# Patient Record
Sex: FEMALE | Race: BLACK OR AFRICAN AMERICAN | NOT HISPANIC OR LATINO | Employment: UNEMPLOYED | URBAN - METROPOLITAN AREA
[De-identification: names, ages, dates, MRNs, and addresses within clinical notes are randomized per-mention and may not be internally consistent; named-entity substitution may affect disease eponyms.]

---

## 2023-06-06 PROBLEM — S16.1XXA CERVICAL STRAIN, ACUTE, INITIAL ENCOUNTER: Status: ACTIVE | Noted: 2023-06-06

## 2023-09-05 PROBLEM — S16.1XXA CERVICAL STRAIN: Status: ACTIVE | Noted: 2023-09-05

## 2024-02-17 PROBLEM — M54.12 CERVICAL RADICULOPATHY: Status: ACTIVE | Noted: 2024-02-17

## 2024-02-17 PROBLEM — R20.2 PARESTHESIA: Status: ACTIVE | Noted: 2024-02-17

## 2024-05-01 ENCOUNTER — OFFICE VISIT (OUTPATIENT)
Dept: ORTHOPEDICS | Facility: CLINIC | Age: 44
End: 2024-05-01
Payer: MEDICAID

## 2024-05-01 VITALS
WEIGHT: 141.31 LBS | HEIGHT: 61 IN | HEART RATE: 90 BPM | SYSTOLIC BLOOD PRESSURE: 121 MMHG | BODY MASS INDEX: 26.68 KG/M2 | DIASTOLIC BLOOD PRESSURE: 77 MMHG

## 2024-05-01 DIAGNOSIS — G56.03 BILATERAL CARPAL TUNNEL SYNDROME: Primary | ICD-10-CM

## 2024-05-01 DIAGNOSIS — M79.641 PAIN OF RIGHT HAND: Primary | ICD-10-CM

## 2024-05-01 PROCEDURE — 1160F RVW MEDS BY RX/DR IN RCRD: CPT | Mod: CPTII,,,

## 2024-05-01 PROCEDURE — 1159F MED LIST DOCD IN RCRD: CPT | Mod: CPTII,,,

## 2024-05-01 PROCEDURE — 3078F DIAST BP <80 MM HG: CPT | Mod: CPTII,,,

## 2024-05-01 PROCEDURE — 99213 OFFICE O/P EST LOW 20 MIN: CPT | Mod: PBBFAC,PN

## 2024-05-01 PROCEDURE — 4010F ACE/ARB THERAPY RXD/TAKEN: CPT | Mod: CPTII,,,

## 2024-05-01 PROCEDURE — 3074F SYST BP LT 130 MM HG: CPT | Mod: CPTII,,,

## 2024-05-01 PROCEDURE — 99999 PR PBB SHADOW E&M-EST. PATIENT-LVL III: CPT | Mod: PBBFAC,,,

## 2024-05-01 PROCEDURE — 3008F BODY MASS INDEX DOCD: CPT | Mod: CPTII,,,

## 2024-05-01 PROCEDURE — 99203 OFFICE O/P NEW LOW 30 MIN: CPT | Mod: S$PBB,,,

## 2024-05-01 NOTE — PROGRESS NOTES
SUBJECTIVE:      Chief Complaint: bilateral hand numbness    History of Present Illness:  Patient is a 43 y.o. right hand dominant female who presents today with complaints of bilateral hand numbness, L > R. Patient reports numbness and stiffness in mostly 1-4th digits. Patient denies known BUD. States this has been ongoing for a few months. Numbness is worsen in morning.  Denies physical therapy, surgery, or injections to hands. Feels her  is affected. Denies DM, smoking, drug use, or DVT/PE.     The patient is a/an .    Onset of symptoms/DOI was 4-5 months prior.    Symptoms are aggravated by waking up and riding bike (gripping).    Symptoms are alleviated by rest.    Symptoms consist of pain and numbness/tingling.    The patient rates their pain as a 2/10.    Attempted treatment(s) and/or interventions include activity modifications, rest.     The patient denies any fevers, chills, N/V, D/C and presents for evaluation.       Past Medical History:   Diagnosis Date    Anxiety     Depression     Hypertension     Panic attack      Past Surgical History:   Procedure Laterality Date    HYSTERECTOMY       Review of patient's allergies indicates:  No Known Allergies  Social History     Social History Narrative    Not on file     No family history on file.      Current Outpatient Medications:     amLODIPine (NORVASC) 10 MG tablet, Take 1 tablet by mouth once daily, Disp: 30 tablet, Rfl: 0    lactulose (CHRONULAC) 20 gram/30 mL Soln, Take 45 mLs (30 g total) by mouth 3 (three) times daily., Disp: 240 mL, Rfl: 0      Review of Systems:  Constitutional: no fever or chills  Eyes: no visual changes  ENT: no nasal congestion or sore throat  Respiratory: no cough or shortness of breath  Cardiovascular: no chest pain  Gastrointestinal: no nausea or vomiting, tolerating diet  Musculoskeletal: numbness/tingling    OBJECTIVE:      Vital Signs (Most Recent):  Vitals:    05/01/24 1341   BP: 121/77   Pulse: 90   Weight:  "64.1 kg (141 lb 5 oz)   Height: 5' 1" (1.549 m)     Body mass index is 26.7 kg/m².      Physical Exam:  Constitutional: The patient appears well-developed and well-nourished. No distress.   Skin: No lesions appreciated  Head: Normocephalic and atraumatic.   Nose: Nose normal.   Ears: No deformities seen  Eyes: Conjunctivae and EOM are normal.   Neck: No tracheal deviation present.   Cardiovascular: Normal rate and intact distal pulses.    Pulmonary/Chest: Effort normal. No respiratory distress.   Abdominal: There is no guarding.   Neurological: The patient is alert.   Psychiatric: The patient has a normal mood and affect.     Bilateral Hand/Wrist Examination:    Observation/Inspection:  Swelling  none    Deformity  none  Discoloration  none     Scars   none    Atrophy  none    HAND/WRIST EXAMINATION:  Finkelstein's Test   Neg  WHAT Test    Neg  Snuff box tenderness   Neg  Huizar's Test    Neg  Hook of Hamate Tenderness  Neg  CMC grind    Neg  Circumduction test   Neg  TTP     none    Neurovascular Exam:  Digits WWP, brisk CR < 3s throughout  NVI motor/LTS to M/R/U nerves, radial pulse 2+  Tinel's Test - Carpal Tunnel  +  Tinel's Test - Cubital Tunnel  Neg  Phalen's Test    +  Median Nerve Compression Test +  AIN Intact (O-Louann), PIN Intact (Thumbs Up), Ulnar Intact (scissors)    ROM hand full, painless    ROM wrist full, painless    ROM elbow full, painless    Abdomen not guarded  Respirations nonlabored  Perfusion intact    Diagnostic Results:     Imaging - I independently viewed the patient's imaging as well as the radiology report.  Xrays of the patient's bilateral hands  demonstrate no evidence of any obvious acute fractures or dislocations or significant degenerative changes.      ASSESSMENT/PLAN:      1. Bilateral carpal tunnel syndrome          I made the decision to obtain old records of the patient including previous notes and imaging. If new imaging was ordered today of the extremity or extremities " evaluated. I independently reviewed and interpreted the xrays as well as prior imaging. Reviewed imaging in detail with patient.     We discussed at length different treatment options including conservative vs surgical management. These include anti-inflammatories, acetaminophen, rest, ice, heat, formal physical therapy including strengthening and stretching exercises, home exercise programs, injections, and finally surgical intervention.      Patient here for acute on chronic bilateral hand numbness.  Based on exam I do believe this is likely carpal tunnel syndrome.  We discussed this diagnosis in detail including treatment options.  Patient would like to hold on EMG at this time.  She would like to trial home exercise program and [facility] wrist extension braces.  Instructed patient to wear bracing at night.  We will see her back in about 8 weeks for consideration of EMG versus injections.    Follow up:  8 weeks  Xrays needed:  None     All of the patient's questions were answered and the patient will contact us if they have any questions or concerns in the interim.

## 2024-08-07 ENCOUNTER — OFFICE VISIT (OUTPATIENT)
Dept: ORTHOPEDICS | Facility: CLINIC | Age: 44
End: 2024-08-07
Payer: MEDICAID

## 2024-08-07 VITALS
HEIGHT: 61 IN | DIASTOLIC BLOOD PRESSURE: 90 MMHG | HEART RATE: 72 BPM | SYSTOLIC BLOOD PRESSURE: 130 MMHG | BODY MASS INDEX: 26.65 KG/M2 | WEIGHT: 141.13 LBS

## 2024-08-07 DIAGNOSIS — G56.03 BILATERAL CARPAL TUNNEL SYNDROME: Primary | ICD-10-CM

## 2024-08-07 PROCEDURE — 1160F RVW MEDS BY RX/DR IN RCRD: CPT | Mod: CPTII,,,

## 2024-08-07 PROCEDURE — 99999 PR PBB SHADOW E&M-EST. PATIENT-LVL III: CPT | Mod: PBBFAC,,,

## 2024-08-07 PROCEDURE — 3008F BODY MASS INDEX DOCD: CPT | Mod: CPTII,,,

## 2024-08-07 PROCEDURE — 4010F ACE/ARB THERAPY RXD/TAKEN: CPT | Mod: CPTII,,,

## 2024-08-07 PROCEDURE — 99213 OFFICE O/P EST LOW 20 MIN: CPT | Mod: PBBFAC,PN

## 2024-08-07 PROCEDURE — 3080F DIAST BP >= 90 MM HG: CPT | Mod: CPTII,,,

## 2024-08-07 PROCEDURE — 1159F MED LIST DOCD IN RCRD: CPT | Mod: CPTII,,,

## 2024-08-07 PROCEDURE — 3075F SYST BP GE 130 - 139MM HG: CPT | Mod: CPTII,,,

## 2024-08-07 PROCEDURE — 99213 OFFICE O/P EST LOW 20 MIN: CPT | Mod: S$PBB,,,

## 2024-08-07 RX ORDER — GABAPENTIN 300 MG/1
300 CAPSULE ORAL 3 TIMES DAILY
Qty: 90 CAPSULE | Refills: 1 | Status: SHIPPED | OUTPATIENT
Start: 2024-08-07 | End: 2025-08-07

## 2024-08-20 ENCOUNTER — TELEPHONE (OUTPATIENT)
Dept: ORTHOPEDICS | Facility: CLINIC | Age: 44
End: 2024-08-20
Payer: MEDICAID

## 2024-08-20 NOTE — TELEPHONE ENCOUNTER
Spoke with patient. Pt advise that next available appt is December. Pt also advise that she was place on a waiting list.

## 2024-08-20 NOTE — TELEPHONE ENCOUNTER
----- Message from Soraya West sent at 8/20/2024 12:17 PM CDT -----  Regarding: Appt Request  Patient called to schedule a follow up appt.No solutions found.    Please call back to further assist- [phone]

## 2024-09-24 ENCOUNTER — TELEPHONE (OUTPATIENT)
Dept: GASTROENTEROLOGY | Facility: CLINIC | Age: 44
End: 2024-09-24
Payer: MEDICAID

## 2024-09-24 NOTE — TELEPHONE ENCOUNTER
-LVm for the patient to call back to get an appt scheduled.      ---- Message from Luis Qureshi sent at 9/24/2024  1:19 PM CDT -----  Type:  Sooner Apoointment Request    Caller is requesting a sooner appointment  Name of Caller:pt  When is the first available appointment?none  Symptoms:acid reflux , other stomach issues   Would the patient rather a call back or a response via [facility]? Call   Best Call Back Number: [phone]  Additional Information:

## 2024-10-14 ENCOUNTER — TELEPHONE (OUTPATIENT)
Dept: OBSTETRICS AND GYNECOLOGY | Facility: CLINIC | Age: 44
End: 2024-10-14
Payer: MEDICAID

## 2024-10-14 NOTE — TELEPHONE ENCOUNTER
----- Message from Myriam sent at 10/14/2024  3:14 PM CDT -----  Regarding: verify appt tomorrow 1:15 - [facility]  Contact:  [phone]  PT called asking to check on appt she has scheduled for tomorrow. PT advised - [facility] is showing OON on verification. There is no referral on file either. Please reach out to patient to advise regarding her appt scheduled tomorrow 1:15    Patient can be contacted @# [phone]

## 2024-10-15 ENCOUNTER — OFFICE VISIT (OUTPATIENT)
Dept: OBSTETRICS AND GYNECOLOGY | Facility: CLINIC | Age: 44
End: 2024-10-15
Payer: MEDICAID

## 2024-10-15 VITALS
SYSTOLIC BLOOD PRESSURE: 150 MMHG | DIASTOLIC BLOOD PRESSURE: 95 MMHG | BODY MASS INDEX: 27.87 KG/M2 | WEIGHT: 147.63 LBS | HEART RATE: 68 BPM | HEIGHT: 61 IN

## 2024-10-15 DIAGNOSIS — Z01.419 WELL WOMAN EXAM WITH ROUTINE GYNECOLOGICAL EXAM: Primary | ICD-10-CM

## 2024-10-15 DIAGNOSIS — Z12.31 ENCOUNTER FOR SCREENING MAMMOGRAM FOR MALIGNANT NEOPLASM OF BREAST: ICD-10-CM

## 2024-10-15 DIAGNOSIS — R30.0 DYSURIA: ICD-10-CM

## 2024-10-15 DIAGNOSIS — Z12.4 SCREENING FOR CERVICAL CANCER: ICD-10-CM

## 2024-10-15 DIAGNOSIS — N89.8 VAGINAL DRYNESS: ICD-10-CM

## 2024-10-15 DIAGNOSIS — N95.1 MENOPAUSAL SYMPTOMS: ICD-10-CM

## 2024-10-15 PROCEDURE — 88175 CYTOPATH C/V AUTO FLUID REDO: CPT | Performed by: OBSTETRICS & GYNECOLOGY

## 2024-10-15 PROCEDURE — 99214 OFFICE O/P EST MOD 30 MIN: CPT | Mod: PBBFAC,PN | Performed by: OBSTETRICS & GYNECOLOGY

## 2024-10-15 PROCEDURE — 87086 URINE CULTURE/COLONY COUNT: CPT | Performed by: OBSTETRICS & GYNECOLOGY

## 2024-10-15 PROCEDURE — 99999 PR PBB SHADOW E&M-EST. PATIENT-LVL IV: CPT | Mod: PBBFAC,,, | Performed by: OBSTETRICS & GYNECOLOGY

## 2024-10-15 PROCEDURE — 87624 HPV HI-RISK TYP POOLED RSLT: CPT | Performed by: OBSTETRICS & GYNECOLOGY

## 2024-10-15 RX ORDER — TRAZODONE HYDROCHLORIDE 100 MG/1
100 TABLET ORAL NIGHTLY PRN
COMMUNITY
Start: 2024-07-25

## 2024-10-15 RX ORDER — AZELASTINE 1 MG/ML
SPRAY, METERED NASAL
COMMUNITY

## 2024-10-15 RX ORDER — DICYCLOMINE HYDROCHLORIDE 20 MG/1
TABLET ORAL
COMMUNITY
Start: 2024-09-16 | End: 2024-10-16

## 2024-10-15 RX ORDER — ESTRADIOL 0.1 MG/G
CREAM VAGINAL
Qty: 42.5 G | Refills: 1 | Status: SHIPPED | OUTPATIENT
Start: 2024-10-15

## 2024-10-15 RX ORDER — FLUOXETINE HYDROCHLORIDE 40 MG/1
40 CAPSULE ORAL
COMMUNITY
Start: 2024-04-25

## 2024-10-15 RX ORDER — FLUCONAZOLE 150 MG/1
150 TABLET ORAL
COMMUNITY
Start: 2024-08-12

## 2024-10-15 RX ORDER — METRONIDAZOLE 7.5 MG/G
GEL VAGINAL
COMMUNITY
Start: 2024-07-02

## 2024-10-15 RX ORDER — HYDROXYZINE PAMOATE 25 MG/1
CAPSULE ORAL
COMMUNITY

## 2024-10-15 RX ORDER — CYPROHEPTADINE HYDROCHLORIDE 4 MG/1
4 TABLET ORAL 2 TIMES DAILY
COMMUNITY
Start: 2024-06-17

## 2024-10-15 RX ORDER — CARIPRAZINE 1.5 MG/1
1.5 CAPSULE, GELATIN COATED ORAL NIGHTLY
COMMUNITY
Start: 2024-07-25

## 2024-10-15 RX ORDER — FERROUS SULFATE TAB 325 MG (65 MG ELEMENTAL FE) 325 (65 FE) MG
TAB ORAL
COMMUNITY
Start: 2024-08-12

## 2024-10-15 RX ORDER — LISINOPRIL 10 MG/1
TABLET ORAL
COMMUNITY
Start: 2024-04-09

## 2024-10-15 RX ORDER — FLUOXETINE HYDROCHLORIDE 20 MG/1
20 CAPSULE ORAL
COMMUNITY
Start: 2024-07-25

## 2024-10-15 RX ORDER — IBUPROFEN 800 MG/1
TABLET ORAL
COMMUNITY

## 2024-10-15 NOTE — PROGRESS NOTES
"Subjective     Patient ID: Vaishali Matthews is a 44 y.o. female.    Chief Complaint:  Well Woman      History of Present Illness  HPI  44 y.o.  presents for annual exam.  Overall doing well, but does report dysuria - frequently treated for UTI, symptoms improve, but then returns.  She reports a h/o hysterectomy, but on further questioning, states she has had both ovaries removed, but asked to keep uterus.  Ovaries removed on two separate occasions due to cysts - last one in 2017.  Reports hot flashes/night sweats since then.  Never on HRT.  Sexually active, but does report discomfort due to dryness.  No vaginal bleeding/spotting.  No other complaints today.  Of note, she has a h/o GERD, followed by GI, unsure when she is due for next colonoscopy, but states she has had one in the past.     GYN & OB History  No LMP recorded (lmp unknown). Patient has had a hysterectomy.   Date of Last Pap: 10/15/2024    OB History    Para Term  AB Living   2 2 2     2   SAB IAB Ectopic Multiple Live Births           2      # Outcome Date GA Lbr Brad/2nd Weight Sex Type Anes PTL Lv   2 Term 02    F Vag-Spont EPI  RICKY   1 Term 10/15/00    F Vag-Spont EPI  RICKY       Review of Systems  Review of Systems   Constitutional:  Negative for chills and fever.   Respiratory:  Negative for cough and shortness of breath.    Cardiovascular:  Negative for chest pain.   Gastrointestinal:  Positive for constipation and reflux. Negative for abdominal pain, blood in stool and diarrhea.   Genitourinary:  Positive for dysuria, hot flashes and vaginal dryness. Negative for dyspareunia, pelvic pain and vaginal discharge.   Musculoskeletal:  Negative for myalgias.   Neurological:  Negative for headaches.          Objective   Physical Exam    BP (!) 150/95 Comment: pt states she did not taker meds.  Pulse 68   Ht 5' 1" (1.549 m)   Wt 67 kg (147 lb 9.6 oz)   LMP  (LMP Unknown)   BMI 27.89 kg/m²     Gen: NAD  Resp: Normal " respiratory effort  Breast: Symmetric, nontender.  No masses.  No skin changes.  No nipple discharge.   Abd: soft, NT  Pelvic: Normal-appearing external female genitalia. Cervix present.  Uterus and adnexa difficult to palpate, no distinct masses or tenderness noted.  Ext: normal ROM  Psych: appropriate affect  Neuro: grossly intact         Assessment and Plan     Vaishali was seen today for well woman.    Diagnoses and all orders for this visit:    Well woman exam with routine gynecological exam  -     Hemoglobin A1C; Future  -     TSH; Future  -     Lipid Panel; Future    Encounter for screening mammogram for malignant neoplasm of breast  -     Mammo Digital Screening Bilat; Future    Dysuria  -     CULTURE, URINE    Vaginal dryness    Menopausal symptoms  -     US Pelvis Comp with Transvag NON-OB (xpd; Future  -     FOLLICLE STIMULATING HORMONE; Future  -     TESTOSTERONE; Future  -     ESTRADIOL; Future    Screening for cervical cancer  -     Liquid-Based Pap Smear, Screening  -     HPV High Risk Genotypes, PCR    Other orders  -     estradioL (ESTRACE) 0.01 % (0.1 mg/gram) vaginal cream; Place pea-sized amount vaginally with finger every night for 3 weeks, then 2-3 times per week.          Plan:  Routine annual exam with pap smear and breast exam today.  STD screening up to date.  MMG orders placed.  Continue f/u with GI regarding colonoscopy.    Discussed exam with patient - she does have a cervix/likely uterus.  Difficult to palpate ovaries if present - will get pelvic US to further evaluate, as well as hormone labs.  eRx vaginal estrace cream for vaginal dryness.  If no ovaries, would recommend HRT till ~51yo - will await labs/US.   Counseling done, precautions given, all questions answered.  RTC 1 year for annual, or prn.

## 2024-10-16 LAB
BACTERIA UR CULT: NORMAL
BACTERIA UR CULT: NORMAL
CLINICAL INFO: NORMAL
DATE OF PREVIOUS PAP: NORMAL
DATE PREVIOUS BX: NO
LMP START DATE: NORMAL
SPECIMEN SOURCE CVX/VAG CYTO: NORMAL

## 2024-10-18 ENCOUNTER — TELEPHONE (OUTPATIENT)
Dept: ENDOSCOPY | Facility: HOSPITAL | Age: 44
End: 2024-10-18
Payer: MEDICAID

## 2024-10-18 ENCOUNTER — TELEPHONE (OUTPATIENT)
Dept: GASTROENTEROLOGY | Facility: CLINIC | Age: 44
End: 2024-10-18
Payer: MEDICAID

## 2024-10-18 NOTE — TELEPHONE ENCOUNTER
Contact patient to schedule colonoscopy. Patient decline requesting to see the GI for belching. May you contact patient to schedule Gi clinic appointment with Dr. Mulligan.     Thanks,

## 2024-10-21 ENCOUNTER — TELEPHONE (OUTPATIENT)
Dept: OBSTETRICS AND GYNECOLOGY | Facility: CLINIC | Age: 44
End: 2024-10-21
Payer: MEDICAID

## 2024-10-21 NOTE — TELEPHONE ENCOUNTER
Called patient about results patient wanted to know if result came in I told her bloodwork came back and we still waiting on the pap smear /hpv and  will give her a call with result when they become available   . Patient understand       ----- Message from Azeb sent at 10/21/2024  1:21 PM CDT -----  Regarding: Results  Contact: Vaishali  Consult/Advisory     Name Of Caller:Vaishali Matthews          Contact Preference:   [phone] (Mobile)            Nature of call:Pt calling to get results of text taken on 10/15/2024. Please advise. Requesting a call back.

## 2024-10-23 ENCOUNTER — TELEPHONE (OUTPATIENT)
Dept: OBSTETRICS AND GYNECOLOGY | Facility: CLINIC | Age: 44
End: 2024-10-23
Payer: MEDICAID

## 2024-10-23 NOTE — TELEPHONE ENCOUNTER
Spoke with patient over the phone regarding results.  US and labs confirm no ovaries, just uterus.  Hormones in menopausal range.  Discussed HRT to help with vasomotor symptoms, as well as bone health until age 50.     Also discussed pap smear - pap nml, HPV+, 16/18 neg.  Will repeat co-testing in 1 year.      While discussed HRT and importance of both estrogen and progesterone, and risk of unopposed estrogen, she became a little concerned.  However, while discussing that adding progesterone will keep the uterine tissue from overgrowing, we became disconnected.  I have tried multiple times to call her back, but it goes straight to voicemail.  Will try to contact again later.

## 2024-10-23 NOTE — TELEPHONE ENCOUNTER
----- Message from Med Assistant Maria T sent at 10/21/2024  2:08 PM CDT -----  Patient is asking about results . Told her we still wanting on pap/hpv and we will give her a call when results come in

## 2024-11-08 ENCOUNTER — TELEPHONE (OUTPATIENT)
Dept: OBSTETRICS AND GYNECOLOGY | Facility: CLINIC | Age: 44
End: 2024-11-08
Payer: MEDICAID

## 2024-11-12 ENCOUNTER — HOSPITAL ENCOUNTER (OUTPATIENT)
Dept: RADIOLOGY | Facility: HOSPITAL | Age: 44
Discharge: HOME | End: 2024-11-12
Attending: OBSTETRICS & GYNECOLOGY
Payer: MEDICAID

## 2024-11-12 DIAGNOSIS — Z12.31 ENCOUNTER FOR SCREENING MAMMOGRAM FOR MALIGNANT NEOPLASM OF BREAST: ICD-10-CM

## 2024-11-12 PROCEDURE — 77067 SCR MAMMO BI INCL CAD: CPT | Mod: TC

## 2024-11-15 ENCOUNTER — PATIENT MESSAGE (OUTPATIENT)
Dept: NEUROLOGY | Facility: CLINIC | Age: 44
End: 2024-11-15
Payer: MEDICAID

## 2024-11-15 ENCOUNTER — OFFICE VISIT (OUTPATIENT)
Dept: ORTHOPEDICS | Facility: CLINIC | Age: 44
End: 2024-11-15
Payer: MEDICAID

## 2024-11-15 ENCOUNTER — TELEPHONE (OUTPATIENT)
Dept: NEUROLOGY | Facility: CLINIC | Age: 44
End: 2024-11-15
Payer: MEDICAID

## 2024-11-15 VITALS
WEIGHT: 147.25 LBS | BODY MASS INDEX: 27.83 KG/M2 | DIASTOLIC BLOOD PRESSURE: 83 MMHG | HEART RATE: 80 BPM | SYSTOLIC BLOOD PRESSURE: 142 MMHG

## 2024-11-15 DIAGNOSIS — G56.03 BILATERAL CARPAL TUNNEL SYNDROME: Primary | ICD-10-CM

## 2024-11-15 PROCEDURE — 99999 PR PBB SHADOW E&M-EST. PATIENT-LVL III: CPT | Mod: PBBFAC,,,

## 2024-11-15 PROCEDURE — 99213 OFFICE O/P EST LOW 20 MIN: CPT | Mod: PBBFAC,PN

## 2024-11-15 RX ORDER — IBUPROFEN 600 MG/1
600 TABLET ORAL 3 TIMES DAILY PRN
Qty: 60 TABLET | Refills: 1 | Status: SHIPPED | OUTPATIENT
Start: 2024-11-15

## 2024-11-15 RX ORDER — GABAPENTIN 300 MG/1
300 CAPSULE ORAL 3 TIMES DAILY
Qty: 90 CAPSULE | Refills: 1 | Status: SHIPPED | OUTPATIENT
Start: 2024-11-15 | End: 2025-11-15

## 2024-11-15 NOTE — PROGRESS NOTES
SUBJECTIVE:      Chief Complaint: bilateral hand numbness    History of Present Illness:  Patient is a 44 y.o. right hand dominant female who presents today with complaints of bilateral hand numbness, L > R. Patient reports numbness and stiffness in mostly 1-4th digits. Patient denies known BUD. States this has been ongoing for a few months. Numbness is worsen in morning.  Denies physical therapy, surgery, or injections to hands. Feels her  is affected. Denies DM, smoking, drug use, or DVT/PE.     The patient is a/an .    Onset of symptoms/DOI was 4-5 months prior.    Symptoms are aggravated by waking up and riding bike (gripping).    Symptoms are alleviated by rest.    Symptoms consist of pain and numbness/tingling.    The patient rates their pain as a 2/10.    Attempted treatment(s) and/or interventions include activity modifications, rest.     The patient denies any fevers, chills, N/V, D/C and presents for evaluation.    ____________________________________________________________________    Interval history 8/7/2024 : Patient returns today for follow up of bilateral wrist pain and numbness. Pain 5/10 today. She has been bracing and feels some improvement. Still experiencing some pain. Has not been on gabapentin. Is worried about injections.     ____________________________________________________________________    Interval history 11/15/2024: Patient returns today for follow up of bilateral hand pain/numbness. She notes pain with movement, but okay at rest. States her  strength.  Requesting ibuprofen and gabapentin refill. States she ran out of OT visits at [facility] and would like to restart this. She is also requesting new braces, which she wears at night.        Past Medical History:   Diagnosis Date    Anxiety     Depression     Hypertension     Panic attack      Past Surgical History:   Procedure Laterality Date    HYSTERECTOMY       Review of patient's allergies indicates:  No  Known Allergies  Social History     Social History Narrative    Not on file     Family History   Problem Relation Name Age of Onset    Breast cancer Mother      Breast cancer Maternal Grandmother      Colon cancer Neg Hx      Ovarian cancer Neg Hx           Current Outpatient Medications:     amLODIPine (NORVASC) 10 MG tablet, Take 1 tablet by mouth once daily, Disp: 30 tablet, Rfl: 0    azelastine (ASTELIN) 137 mcg (0.1 %) nasal spray, ONE PUFF IN EACH NOSTRIL TWICE DAILY for 30, Disp: , Rfl:     cyproheptadine (PERIACTIN) 4 mg tablet, Take 4 mg by mouth 2 (two) times daily., Disp: , Rfl:     estradioL (ESTRACE) 0.01 % (0.1 mg/gram) vaginal cream, Place pea-sized amount vaginally with finger every night for 3 weeks, then 2-3 times per week., Disp: 42.5 g, Rfl: 1    FEROSUL 325 mg (65 mg iron) Tab tablet, Take by mouth., Disp: , Rfl:     fluconazole (DIFLUCAN) 100 MG tablet, Take 1 tablet (100 mg total) by mouth every other day., Disp: 3 tablet, Rfl: 0    FLUoxetine 20 MG capsule, Take 20 mg by mouth., Disp: , Rfl:     FLUoxetine 40 MG capsule, Take 40 mg by mouth., Disp: , Rfl:     gabapentin (NEURONTIN) 300 MG capsule, Take 1 capsule (300 mg total) by mouth 3 (three) times daily., Disp: 90 capsule, Rfl: 1    hydrOXYzine pamoate (VISTARIL) 25 MG Cap, 1 capsule as needed for anxiety Orally every 8 hrs, Disp: , Rfl:     ibuprofen (ADVIL,MOTRIN) 800 MG tablet, 1 tablet with food or milk as needed Orally every 8 hrs, Disp: , Rfl:     lactulose (CHRONULAC) 20 gram/30 mL Soln, Take 45 mLs (30 g total) by mouth 3 (three) times daily., Disp: 240 mL, Rfl: 0    lisinopriL 10 MG tablet, 1 tablet Orally Once a day Blood pressure, Disp: , Rfl:     metroNIDAZOLE (METROGEL) 0.75 % (37.5mg/5 gram) vaginal gel, Place vaginally., Disp: , Rfl:     traZODone (DESYREL) 100 MG tablet, Take 100 mg by mouth nightly as needed., Disp: , Rfl:     trazodone/dietary supp. no.8 (TRAZAMINE ORAL), Take by mouth., Disp: , Rfl:     VRAYLAR 1.5 mg  Cap, Take 1.5 mg by mouth every evening., Disp: , Rfl:       Review of Systems:  Constitutional: no fever or chills  Eyes: no visual changes  ENT: no nasal congestion or sore throat  Respiratory: no cough or shortness of breath  Cardiovascular: no chest pain  Gastrointestinal: no nausea or vomiting, tolerating diet  Musculoskeletal: numbness/tingling    OBJECTIVE:      Vital Signs (Most Recent):  Vitals:    11/15/24 1418   BP: (!) 142/83   BP Location: Left arm   Pulse: 80   Weight: 66.8 kg (147 lb 4.3 oz)     Body mass index is 27.83 kg/m².      Physical Exam:  Constitutional: The patient appears well-developed and well-nourished. No distress.   Skin: No lesions appreciated  Head: Normocephalic and atraumatic.   Nose: Nose normal.   Ears: No deformities seen  Eyes: Conjunctivae and EOM are normal.   Neck: No tracheal deviation present.   Cardiovascular: Normal rate and intact distal pulses.    Pulmonary/Chest: Effort normal. No respiratory distress.   Abdominal: There is no guarding.   Neurological: The patient is alert.   Psychiatric: The patient has a normal mood and affect.     Bilateral Hand/Wrist Examination:    Observation/Inspection:  Swelling  none    Deformity  none  Discoloration  none     Scars   none    Atrophy  none    HAND/WRIST EXAMINATION:  Finkelstein's Test   Neg  WHAT Test    Neg  Snuff box tenderness   Neg  Huizar's Test    Neg  Hook of Hamate Tenderness  Neg  CMC grind    Neg  Circumduction test   Neg  TTP     none    Neurovascular Exam:  Digits WWP, brisk CR < 3s throughout  NVI motor/LTS to M/R/U nerves, radial pulse 2+  Tinel's Test - Carpal Tunnel  +  Tinel's Test - Cubital Tunnel  Neg  Phalen's Test    +  Median Nerve Compression Test +  AIN Intact (O-Louann), PIN Intact (Thumbs Up), Ulnar Intact (scissors)    ROM hand full, painless    ROM wrist full, painless    ROM elbow full, painless    Abdomen not guarded  Respirations nonlabored  Perfusion intact    Diagnostic Results:     Imaging - I  independently viewed the patient's imaging as well as the radiology report.  Xrays of the patient's bilateral hands  demonstrate no evidence of any obvious acute fractures or dislocations or significant degenerative changes.      ASSESSMENT/PLAN:      1. Bilateral carpal tunnel syndrome  Ambulatory referral/consult to Physical/Occupational Therapy        Patient here today for follow up of bilateral carpal tunnel syndrome.  [facility]  New referral placed to continue occupational therapy.  Also requesting new braces, bilateral wrist extension braces given to wear at night.  Refill of ibuprofen and gabapentin provided as patient states this improves her hand pain.  Discussed she is now dropping objects and her symptoms are worsening.  I recommended an EMG today which she was going to think about.  EMG bilateral upper extremities ordered to evaluate for carpal tunnel syndrome.  Also offered diagnostic and therapeutic carpal tunnel injections which she deferred.  Highly recommended EMG however patient is going to think about it.    Follow up: EMG results  Xrays needed:  None     All of the patient's questions were answered and the patient will contact us if they have any questions or concerns in the interim.

## 2024-11-15 NOTE — TELEPHONE ENCOUNTER
----- Message from Med Assistant Knutson sent at 11/15/2024  2:35 PM CST -----  Good you please help us get this pt scheduled this is her number [phone] and I made sure to let her know this test is not done at [facility]. [facility] and have a great weekend.

## 2024-11-15 NOTE — TELEPHONE ENCOUNTER
Staff called and left a voice message for patient to contact the clinic. Staff also send patient a portal message.

## 2025-01-06 ENCOUNTER — PATIENT MESSAGE (OUTPATIENT)
Dept: OBSTETRICS AND GYNECOLOGY | Facility: CLINIC | Age: 45
End: 2025-01-06
Payer: MEDICAID

## 2025-01-07 ENCOUNTER — TELEPHONE (OUTPATIENT)
Dept: OBSTETRICS AND GYNECOLOGY | Facility: CLINIC | Age: 45
End: 2025-01-07
Payer: MEDICAID

## 2025-01-07 ENCOUNTER — TELEPHONE (OUTPATIENT)
Dept: ORTHOPEDICS | Facility: CLINIC | Age: 45
End: 2025-01-07
Payer: MEDICAID

## 2025-01-07 NOTE — TELEPHONE ENCOUNTER
----- Message from Luis sent at 1/7/2025 12:29 PM CST -----  Type:  Sooner Apoointment Request    Caller is requesting a sooner appointment.     Name of Caller:pt   When is the first available appointment?none  Symptoms:annual well woman  Would the patient rather a call back or a response via [facility]? Call   Best Call Back Number: [phone]  Additional Information:

## 2025-01-07 NOTE — TELEPHONE ENCOUNTER
----- Message from Luis sent at 1/7/2025 12:27 PM CST -----  Type:  Sooner Apoointment Request    Caller is requesting a sooner appointment.    Name of Caller:pt   When is the first available appointment?none  Symptoms:carpal tunnel In both hands   Would the patient rather a call back or a response via [facility]? Call   Best Call Back Number: [phone]  Additional Information:

## 2025-01-09 ENCOUNTER — TELEPHONE (OUTPATIENT)
Dept: ORTHOPEDICS | Facility: CLINIC | Age: 45
End: 2025-01-09
Payer: MEDICAID

## 2025-01-09 NOTE — TELEPHONE ENCOUNTER
----- Message from Luis sent at 1/7/2025 12:59 PM CST -----  There were no appointments were  generating, I sent an message to help the patient get scheduled for an appt  ----- Message -----  From: Eamon Pa MA  Sent: 1/7/2025  12:56 PM CST  To: Luis Qureshi    Did you offer her a date and time?  ----- Message -----  From: Luis Qureshi  Sent: 1/7/2025  12:31 PM CST  To: Bud BORDEN Staff    Type:  Sooner Apoointment Request    Caller is requesting a sooner appointment.    Name of Caller:pt   When is the first available appointment?none  Symptoms:carpal tunnel In both hands   Would the patient rather a call back or a response via [facility]? Call   Best Call Back Number: [phone]  Additional Information:

## 2025-01-29 ENCOUNTER — TELEPHONE (OUTPATIENT)
Dept: PODIATRY | Facility: CLINIC | Age: 45
End: 2025-01-29
Payer: MEDICAID

## 2025-01-29 NOTE — TELEPHONE ENCOUNTER
Spoke with patient and next available for a new patient is in October. Patient stated she will try some place else. Verbalized understanding and no further issues discussed.----- Message from Jason sent at 1/29/2025 11:47 AM CST -----  Regarding: Scheduling Request  Contact: [phone]  Scheduling Request           Appt Type:   Pain in foot     Date/Time Preference: As soon as available     Treating Provider: N/A     Caller Name: Vaishali Matthews     Contact Preference: [phone]     Comments/notes: Pt has knots or lumps on the sides of her big toes that are painful. Can't walk without being in pain.

## 2025-02-03 ENCOUNTER — TELEPHONE (OUTPATIENT)
Dept: ORTHOPEDICS | Facility: CLINIC | Age: 45
End: 2025-02-03
Payer: MEDICAID

## 2025-02-03 NOTE — TELEPHONE ENCOUNTER
----- Message from Alana sent at 2/3/2025  4:04 PM CST -----  Contact: [phone]  Pt is coming in to see you for issues with carpal tunnel and she would like to know, since nothing is available until 2/27/2025, if you could call in a pain cream to rub on her hands? If not, can you please see her sooner then 2/27/2025. She did not want to have sx done b/c she did not want to have pins placed in her fingers and hand.      [facility] - [place] - [address]  [address] [place] [address]  Phone: [phone] Fax: [phone]

## 2025-02-04 NOTE — TELEPHONE ENCOUNTER
Returned the pt's call to give her instruction per Provider and all questions were answered. Pt v/u

## 2025-02-19 ENCOUNTER — TELEPHONE (OUTPATIENT)
Dept: PODIATRY | Facility: CLINIC | Age: 45
End: 2025-02-19
Payer: MEDICAID

## 2025-02-19 NOTE — TELEPHONE ENCOUNTER
"Left message that the soonest is in October 2025. Try contacting her insurance company to see who is on her plan for a sooner appointment.----- Message from Harshal sent at 2/19/2025 11:16 AM CST -----  Regarding: call back  Consult/Advisory:  Name Of Caller: Self Contact Preference?: [phone] What is the nature of the call?: Calling to speak w/  someone in regards to scheduling a NP appt due to foot pain   Additional Notes:"Thank you for all that you do for our patients"  "
"few months"

## 2025-02-20 ENCOUNTER — TELEPHONE (OUTPATIENT)
Dept: PODIATRY | Facility: CLINIC | Age: 45
End: 2025-02-20
Payer: MEDICAID

## 2025-02-20 NOTE — TELEPHONE ENCOUNTER
"Spoke with patient and October is the soonest. Than call got disconnected.----- Message from Harshal sent at 2/20/2025  9:31 AM CST -----  Regarding: miss call  Consult/Advisory:  Name Of Caller: Self  Contact Preference?:[phone]  What is the nature of the call?: Returning call to Caroline  Additional Notes:"Thank you for all that you do for our patients"  "

## 2025-02-25 ENCOUNTER — TELEPHONE (OUTPATIENT)
Dept: PODIATRY | Facility: CLINIC | Age: 45
End: 2025-02-25
Payer: MEDICAID

## 2025-02-25 NOTE — TELEPHONE ENCOUNTER
Left message returning her call. We are out of network with her insurance.----- Message from Soraya sent at 2/25/2025  2:06 PM CST -----  Regarding: Appt  Type:  Patient Returning CallWho Called:Vaishali Robertson Left Message for Patient:Patricia Espino the patient know what this is regarding?: Pain on both big toesWould the patient rather a call back or a response via [facility]? Call [facility] Call Back Number:[phone] Information:

## 2025-07-07 DIAGNOSIS — Z86.19 PERSONAL HISTORY OF OTHER INFECTIOUS AND PARASITIC DISEASES: Primary | ICD-10-CM
